# Patient Record
Sex: FEMALE | Race: OTHER | HISPANIC OR LATINO
[De-identification: names, ages, dates, MRNs, and addresses within clinical notes are randomized per-mention and may not be internally consistent; named-entity substitution may affect disease eponyms.]

---

## 2022-12-11 ENCOUNTER — NON-APPOINTMENT (OUTPATIENT)
Age: 67
End: 2022-12-11

## 2022-12-12 ENCOUNTER — EMERGENCY (EMERGENCY)
Facility: HOSPITAL | Age: 67
LOS: 0 days | Discharge: HOME | End: 2022-12-12
Attending: EMERGENCY MEDICINE | Admitting: EMERGENCY MEDICINE

## 2022-12-12 VITALS
OXYGEN SATURATION: 97 % | WEIGHT: 160.06 LBS | SYSTOLIC BLOOD PRESSURE: 124 MMHG | RESPIRATION RATE: 20 BRPM | DIASTOLIC BLOOD PRESSURE: 84 MMHG | HEART RATE: 78 BPM | TEMPERATURE: 100 F

## 2022-12-12 VITALS
HEART RATE: 77 BPM | SYSTOLIC BLOOD PRESSURE: 182 MMHG | RESPIRATION RATE: 20 BRPM | DIASTOLIC BLOOD PRESSURE: 74 MMHG | OXYGEN SATURATION: 100 %

## 2022-12-12 DIAGNOSIS — M25.511 PAIN IN RIGHT SHOULDER: ICD-10-CM

## 2022-12-12 DIAGNOSIS — M54.2 CERVICALGIA: ICD-10-CM
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
## 2022-12-12 DIAGNOSIS — S06.0X9A CONCUSSION WITH LOSS OF CONSCIOUSNESS OF UNSPECIFIED DURATION, INITIAL ENCOUNTER: ICD-10-CM

## 2022-12-12 DIAGNOSIS — Z23 ENCOUNTER FOR IMMUNIZATION: ICD-10-CM
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
## 2022-12-12 DIAGNOSIS — S70.11XA CONTUSION OF RIGHT THIGH, INITIAL ENCOUNTER: ICD-10-CM

## 2022-12-12 LAB
ALBUMIN SERPL ELPH-MCNC: 4.5 G/DL — SIGNIFICANT CHANGE UP (ref 3.5–5.2)
ALP SERPL-CCNC: 119 U/L — HIGH (ref 30–115)
ALT FLD-CCNC: 11 U/L — SIGNIFICANT CHANGE UP (ref 0–41)
ANION GAP SERPL CALC-SCNC: 14 MMOL/L — SIGNIFICANT CHANGE UP (ref 7–14)
APTT BLD: 32.3 SEC — SIGNIFICANT CHANGE UP (ref 27–39.2)
AST SERPL-CCNC: 19 U/L — SIGNIFICANT CHANGE UP (ref 0–41)
BASOPHILS # BLD AUTO: 0.03 K/UL — SIGNIFICANT CHANGE UP (ref 0–0.2)
BASOPHILS NFR BLD AUTO: 0.2 % — SIGNIFICANT CHANGE UP (ref 0–1)
BILIRUB SERPL-MCNC: 0.4 MG/DL — SIGNIFICANT CHANGE UP (ref 0.2–1.2)
BUN SERPL-MCNC: 16 MG/DL — SIGNIFICANT CHANGE UP (ref 10–20)
CALCIUM SERPL-MCNC: 9.8 MG/DL — SIGNIFICANT CHANGE UP (ref 8.4–10.5)
CHLORIDE SERPL-SCNC: 102 MMOL/L — SIGNIFICANT CHANGE UP (ref 98–110)
CO2 SERPL-SCNC: 23 MMOL/L — SIGNIFICANT CHANGE UP (ref 17–32)
CREAT SERPL-MCNC: 0.8 MG/DL — SIGNIFICANT CHANGE UP (ref 0.7–1.5)
EGFR: 81 ML/MIN/1.73M2 — SIGNIFICANT CHANGE UP
EOSINOPHIL # BLD AUTO: 0.41 K/UL — SIGNIFICANT CHANGE UP (ref 0–0.7)
EOSINOPHIL NFR BLD AUTO: 3.1 % — SIGNIFICANT CHANGE UP (ref 0–8)
ETHANOL SERPL-MCNC: <10 MG/DL — SIGNIFICANT CHANGE UP
GLUCOSE SERPL-MCNC: 85 MG/DL — SIGNIFICANT CHANGE UP (ref 70–99)
HCT VFR BLD CALC: 43.8 % — SIGNIFICANT CHANGE UP (ref 37–47)
HGB BLD-MCNC: 14.3 G/DL — SIGNIFICANT CHANGE UP (ref 12–16)
IMM GRANULOCYTES NFR BLD AUTO: 0.4 % — HIGH (ref 0.1–0.3)
INR BLD: 0.94 RATIO — SIGNIFICANT CHANGE UP (ref 0.65–1.3)
LACTATE SERPL-SCNC: 1.4 MMOL/L — SIGNIFICANT CHANGE UP (ref 0.7–2)
LIDOCAIN IGE QN: 35 U/L — SIGNIFICANT CHANGE UP (ref 7–60)
LYMPHOCYTES # BLD AUTO: 1.34 K/UL — SIGNIFICANT CHANGE UP (ref 1.2–3.4)
LYMPHOCYTES # BLD AUTO: 10.3 % — LOW (ref 20.5–51.1)
MCHC RBC-ENTMCNC: 28.6 PG — SIGNIFICANT CHANGE UP (ref 27–31)
MCHC RBC-ENTMCNC: 32.6 G/DL — SIGNIFICANT CHANGE UP (ref 32–37)
MCV RBC AUTO: 87.6 FL — SIGNIFICANT CHANGE UP (ref 81–99)
MONOCYTES # BLD AUTO: 0.48 K/UL — SIGNIFICANT CHANGE UP (ref 0.1–0.6)
MONOCYTES NFR BLD AUTO: 3.7 % — SIGNIFICANT CHANGE UP (ref 1.7–9.3)
NEUTROPHILS # BLD AUTO: 10.76 K/UL — HIGH (ref 1.4–6.5)
NEUTROPHILS NFR BLD AUTO: 82.3 % — HIGH (ref 42.2–75.2)
NRBC # BLD: 0 /100 WBCS — SIGNIFICANT CHANGE UP (ref 0–0)
PLATELET # BLD AUTO: 342 K/UL — SIGNIFICANT CHANGE UP (ref 130–400)
POTASSIUM SERPL-MCNC: 4.8 MMOL/L — SIGNIFICANT CHANGE UP (ref 3.5–5)
POTASSIUM SERPL-SCNC: 4.8 MMOL/L — SIGNIFICANT CHANGE UP (ref 3.5–5)
PROT SERPL-MCNC: 7 G/DL — SIGNIFICANT CHANGE UP (ref 6–8)
PROTHROM AB SERPL-ACNC: 10.7 SEC — SIGNIFICANT CHANGE UP (ref 9.95–12.87)
RBC # BLD: 5 M/UL — SIGNIFICANT CHANGE UP (ref 4.2–5.4)
RBC # FLD: 12.5 % — SIGNIFICANT CHANGE UP (ref 11.5–14.5)
SODIUM SERPL-SCNC: 139 MMOL/L — SIGNIFICANT CHANGE UP (ref 135–146)
WBC # BLD: 13.07 K/UL — HIGH (ref 4.8–10.8)
WBC # FLD AUTO: 13.07 K/UL — HIGH (ref 4.8–10.8)

## 2022-12-12 PROCEDURE — 73552 X-RAY EXAM OF FEMUR 2/>: CPT | Mod: 26,RT

## 2022-12-12 PROCEDURE — 72125 CT NECK SPINE W/O DYE: CPT | Mod: 26,MA

## 2022-12-12 PROCEDURE — 99285 EMERGENCY DEPT VISIT HI MDM: CPT

## 2022-12-12 PROCEDURE — 71045 X-RAY EXAM CHEST 1 VIEW: CPT | Mod: 26

## 2022-12-12 PROCEDURE — 72170 X-RAY EXAM OF PELVIS: CPT | Mod: 26

## 2022-12-12 PROCEDURE — 70450 CT HEAD/BRAIN W/O DYE: CPT | Mod: 26,MA

## 2022-12-12 PROCEDURE — 73030 X-RAY EXAM OF SHOULDER: CPT | Mod: 26,RT

## 2022-12-12 RX ORDER — ACETAMINOPHEN 500 MG
650 TABLET ORAL ONCE
Refills: 0 | Status: COMPLETED | OUTPATIENT
Start: 2022-12-12 | End: 2022-12-12

## 2022-12-12 RX ORDER — MORPHINE SULFATE 50 MG/1
4 CAPSULE, EXTENDED RELEASE ORAL ONCE
Refills: 0 | Status: DISCONTINUED | OUTPATIENT
Start: 2022-12-12 | End: 2022-12-12

## 2022-12-12 RX ORDER — TETANUS TOXOID, REDUCED DIPHTHERIA TOXOID AND ACELLULAR PERTUSSIS VACCINE, ADSORBED 5; 2.5; 8; 8; 2.5 [IU]/.5ML; [IU]/.5ML; UG/.5ML; UG/.5ML; UG/.5ML
0.5 SUSPENSION INTRAMUSCULAR ONCE
Refills: 0 | Status: COMPLETED | OUTPATIENT
Start: 2022-12-12 | End: 2022-12-12

## 2022-12-12 RX ADMIN — TETANUS TOXOID, REDUCED DIPHTHERIA TOXOID AND ACELLULAR PERTUSSIS VACCINE, ADSORBED 0.5 MILLILITER(S): 5; 2.5; 8; 8; 2.5 SUSPENSION INTRAMUSCULAR at 11:03

## 2022-12-12 RX ADMIN — Medication 650 MILLIGRAM(S): at 12:02

## 2022-12-12 RX ADMIN — MORPHINE SULFATE 4 MILLIGRAM(S): 50 CAPSULE, EXTENDED RELEASE ORAL at 11:05

## 2022-12-12 NOTE — ED PROVIDER NOTE - CARE PROVIDER_API CALL
Your PCP,   Phone: (   )    -  Fax: (   )    -  Follow Up Time: 1-3 Days   Your PCP,   Phone: (   )    -  Fax: (   )    -  Follow Up Time: 1-3 Days    Kenrick Stevens)  EEGEpilepsy; Neurology  [address] [place] [address], [address]  [place] [address]  Phone: [phone]  Fax: [phone]  Follow Up Time: 1-3 Days

## 2022-12-12 NOTE — ED PROVIDER NOTE - OBJECTIVE STATEMENT
Pt is a 66 y/o Urdu speaking female with ? presenting s/p fall at home. Pt reports walking down the stairs when she slipped on her slipper and fell down ~12 stairs. + Head trauma, + LOC, no blood thinners. Pt was unable to get up on her own. Pt's daughter lives next door and checked on pt and helped her get up. Pt was ambulatory after. Reports pain to right side of her head and right shoulder. Went to [facility] who placed staples on scalp. Pt is a 68 y/o Greenlandic speaking female with PMH of glaucoma presenting s/p fall at home. Pt reports walking down the stairs when she slipped on her slipper and fell down ~12 stairs. + Head trauma, + LOC, no blood thinners. Pt was unable to get up on her own. Pt's daughter lives next door and checked on pt and helped her get up. Pt was ambulatory after. Reports pain to right side of her head and right shoulder. Went to [facility] [place] staples on scalp.

## 2022-12-12 NOTE — ED PROVIDER NOTE - PHYSICAL EXAMINATION
CONST: mild distress  HEAD: staples to right temporoparietal scalp with dried blood  EYES: PERRLA, conjunctivae without injection, drainage or discharge, no raccoon eyes  ENMT: nasal mucosa moist; mouth moist without ulcerations or lesions; throat moist without erythema, exudate, ulcerations or lesions  NECK:  supple, + cervical midline tenderness  CARDIAC:  regular rate and rhythm, normal S1 and S2, no murmurs, rubs or gallops  RESP:  respiratory rate and effort appear normal for age; lungs are clear to auscultation bilaterally; no rales or wheezes  ABDOMEN:  soft, nontender, nondistended  EXTREMITIES: + right shoulder tenderness with decreased ROM, + ecchymoses to right thigh  NEURO:  awake and alert, SCHULER, sensation intact throughout  SKIN:  normal skin color for age and race, well-perfused; warm and dry CONST: mild distress  HEAD: 5 staples to right temporoparietal scalp with dried blood  EYES: PERRLA, conjunctivae without injection, drainage or discharge, no raccoon eyes  ENMT: nasal mucosa moist; mouth moist without ulcerations or lesions; throat moist without erythema, exudate, ulcerations or lesions  NECK:  supple, + cervical midline tenderness  CARDIAC:  regular rate and rhythm, normal S1 and S2, no murmurs, rubs or gallops  RESP:  respiratory rate and effort appear normal for age; lungs are clear to auscultation bilaterally; no rales or wheezes  ABDOMEN:  soft, nontender, nondistended  EXTREMITIES: + right shoulder tenderness with decreased ROM, + ecchymoses to right thigh  NEURO:  awake and alert, SCHULER, sensation intact throughout  SKIN:  normal skin color for age and race, well-perfused; warm and dry

## 2022-12-12 NOTE — ED PROVIDER NOTE - PROVIDER TOKENS
FREE:[LAST:[Your PCP],PHONE:[(   )    -],FAX:[(   )    -],FOLLOWUP:[1-3 Days]] FREE:[LAST:[Your PCP],PHONE:[(   )    -],FAX:[(   )    -],FOLLOWUP:[1-3 Days]],PROVIDER:[TOKEN:[[id number]],FOLLOWUP:[1-3 Days]]

## 2022-12-12 NOTE — ED PROVIDER NOTE - PROGRESS NOTE DETAILS
PS: CT head and C spine negative for trauma. Incidental finding of age indeterminate infarct. Neuro intact. Pt and daughter aware. Recommended outpatient follow up for MRI. PS: Pt ambulated well without assistance. Pt to follow up with neuro outpatient and knows to return for staple removal

## 2022-12-12 NOTE — ED PROVIDER NOTE - PATIENT PORTAL LINK FT
You can access the FollowMyHealth Patient Portal offered by [facility] by registering at the following website: [email or web address]. By joining [employer]’s FollowMyHealth portal, you will also be able to view your health information using other applications (apps) compatible with our system.

## 2022-12-12 NOTE — ED PROVIDER NOTE - ATTENDING CONTRIBUTION TO CARE
67-year-old female presenting status post mechanical trip and fall down 12 steps.  Positive head injury, positive LOC.  No anticoagulation.  Went to urgent care, had staples placed in scalp.  Sent in for further evaluation.  Currently also complaining of neck pain and right shoulder pain.  Has been ambulatory since fall.  Well appearing, NAD, non toxic.  Stapled scalp laceration PERRLA EOMI neck supple midline C-spine tenderness normal wob cta bl rrr abdomen s nt nd no rebound no guarding WWPx4 neuro non focal 2+ equal pulses, less than 2-second capillary refill, ecchymosis to right lower extremity, tenderness palpation right shoulder.

## 2022-12-12 NOTE — ED PROVIDER NOTE - CARE PROVIDERS DIRECT ADDRESSES
,DirectAddress_Unknown ,DirectAddress_Unknown,[email or web address]

## 2022-12-12 NOTE — ED PROVIDER NOTE - CLINICAL SUMMARY MEDICAL DECISION MAKING FREE TEXT BOX
67-year-old female presenting status post mechanical trip and fall down 12 steps.  Positive head injury, positive LOC.  No anticoagulation.  Went to urgent care, had staples placed in scalp.  Sent in for further evaluation.  Currently also complaining of neck pain and right shoulder pain.  Has been ambulatory since fall.  Well appearing, NAD, non toxic.  Stapled scalp laceration PERRLA EOMI neck supple midline C-spine tenderness normal wob cta bl rrr abdomen s nt nd no rebound no guarding WWPx4 neuro non focal 2+ equal pulses, less than 2-second capillary refill, ecchymosis to right lower extremity, tenderness palpation right shoulder. labs imaging reviewed. pt ambulatory wo assistance. Aware of all results, given a copy of all available results, comfortable with discharge and follow-up outpatient, strict return precautions given. Endorses understanding of all of this and aware that they can return at any time for new or concerning symptoms. No further questions or concerns at this time

## 2022-12-12 NOTE — ED ADULT NURSE NOTE - NSIMPLEMENTINTERV_GEN_ALL_ED
Implemented All Fall with Harm Risk Interventions:  [place] to call system. Call bell, personal items and telephone within reach. Instruct patient to call for assistance. Room bathroom lighting operational. Non-slip footwear when patient is off stretcher. Physically safe environment: no spills, clutter or unnecessary equipment. Stretcher in lowest position, wheels locked, appropriate side rails in place. Provide visual cue, wrist band, yellow gown, etc. Monitor gait and stability. Monitor for mental status changes and reorient to person, place, and time. Review medications for side effects contributing to fall risk. Reinforce activity limits and safety measures with patient and family. Provide visual clues: red socks.

## 2022-12-12 NOTE — ED ADULT NURSE NOTE - OBJECTIVE STATEMENT
BIBEMS from [facility] for fall down flight of stairs (+) head trauma (+) LOC, denies AC use. C-collar in place c/o neck pain.

## 2022-12-12 NOTE — ED PROVIDER NOTE - NSFOLLOWUPINSTRUCTIONS_ED_ALL_ED_FT
Please return in 7-10 days for staple removal    Fall Prevention in the Home, Adult      Falls can cause injuries and can happen to people of all ages. There are many things you can do to make your home safe and to help prevent falls. Ask for help when making these changes.      What actions can I take to prevent falls?    General Instructions     •Use good lighting in all rooms. Replace any light bulbs that burn out.      •Turn on the lights in dark areas. Use night-lights.      •Keep items that you use often in easy-to-reach places. Lower the shelves around your home if needed.      •Set up your furniture so you have a clear path. Avoid moving your furniture around.      • Do not have throw rugs or other things on the floor that can make you trip.      •Avoid walking on wet floors.      •If any of your floors are uneven, fix them.    •Add color or contrast paint or tape to clearly noah and help you see:  •Grab bars or handrails.      •First and last steps of staircases.      •Where the edge of each step is.      •If you use a stepladder:  •Make sure that it is fully opened. Do not climb a closed stepladder.      •Make sure the sides of the stepladder are locked in place.      •Ask someone to hold the stepladder while you use it.        •Know where your pets are when moving through your home.        What can I do in the bathroom?                   •Keep the floor dry. Clean up any water on the floor right away.      •Remove soap buildup in the tub or shower.      •Use nonskid mats or decals on the floor of the tub or shower.      •Attach bath mats securely with double-sided, nonslip rug tape.      •If you need to sit down in the shower, use a plastic, nonslip stool.      •Install grab bars by the toilet and in the tub and shower. Do not use towel bars as grab bars.      What can I do in the bedroom?     •Make sure that you have a light by your bed that is easy to reach.      • Do not use any sheets or blankets for your bed that hang to the floor.      •Have a firm chair with side arms that you can use for support when you get dressed.      What can I do in the kitchen?     •Clean up any spills right away.      •If you need to reach something above you, use a step stool with a grab bar.      •Keep electrical cords out of the way.      • Do not use floor polish or wax that makes floors slippery.      What can I do with my stairs?     • Do not leave any items on the stairs.      •Make sure that you have a light switch at the top and the bottom of the stairs.      •Make sure that there are handrails on both sides of the stairs. Fix handrails that are broken or loose.      •Install nonslip stair treads on all your stairs.      •Avoid having throw rugs at the top or bottom of the stairs.      •Choose a carpet that does not hide the edge of the steps on the stairs.      •Check carpeting to make sure that it is firmly attached to the stairs. Fix carpet that is loose or worn.      What can I do on the outside of my home?     •Use bright outdoor lighting.      •Fix the edges of walkways and driveways and fix any cracks.      •Remove anything that might make you trip as you walk through a door, such as a raised step or threshold.      •Trim any bushes or trees on paths to your home.      •Check to see if handrails are loose or broken and that both sides of all steps have handrails.      •Install guardrails along the edges of any raised decks and porches.      •Clear paths of anything that can make you trip, such as tools or rocks.      •Have leaves, snow, or ice cleared regularly.      •Use sand or salt on paths during winter.      •Clean up any spills in your garage right away. This includes grease or oil spills.      What other actions can I take?   •Wear shoes that:  •Have a low heel. Do not wear high heels.      •Have rubber bottoms.      •Feel good on your feet and fit well.      •Are closed at the toe. Do not wear open-toe sandals.      •Use tools that help you move around if needed. These include:  •Canes.      •Walkers.      •Scooters.      •Crutches.        •Review your medicines with your doctor. Some medicines can make you feel dizzy. This can increase your chance of falling.      Ask your doctor what else you can do to help prevent falls.      Where to find more information    •Centers for Disease Control and Prevention, STEADI: www.cdc.gov      •National [place] on Aging: www.jess.nih.gov        Contact a doctor if:    •You are afraid of falling at home.      •You feel weak, drowsy, or dizzy at home.      •You fall at home.        Summary    •There are many simple things that you can do to make your home safe and to help prevent falls.      •Ways to make your home safe include removing things that can make you trip and installing grab bars in the bathroom.      •Ask for help when making these changes in your home.      This information is not intended to replace advice given to you by your health care provider. Make sure you discuss any questions you have with your health care provider.

## 2022-12-19 ENCOUNTER — NON-APPOINTMENT (OUTPATIENT)
Age: 67
End: 2022-12-19

## 2023-01-31 ENCOUNTER — EMERGENCY (EMERGENCY)
Facility: HOSPITAL | Age: 68
LOS: 0 days | Discharge: HOME | End: 2023-02-01
Attending: STUDENT IN AN ORGANIZED HEALTH CARE EDUCATION/TRAINING PROGRAM | Admitting: STUDENT IN AN ORGANIZED HEALTH CARE EDUCATION/TRAINING PROGRAM
Payer: MEDICARE

## 2023-01-31 VITALS
SYSTOLIC BLOOD PRESSURE: 151 MMHG | RESPIRATION RATE: 18 BRPM | DIASTOLIC BLOOD PRESSURE: 67 MMHG | TEMPERATURE: 98 F | HEART RATE: 86 BPM | OXYGEN SATURATION: 99 % | WEIGHT: 152.12 LBS

## 2023-01-31 DIAGNOSIS — K52.9 NONINFECTIVE GASTROENTERITIS AND COLITIS, UNSPECIFIED: ICD-10-CM

## 2023-01-31 DIAGNOSIS — R50.9 FEVER, UNSPECIFIED: ICD-10-CM

## 2023-01-31 DIAGNOSIS — K92.1 MELENA: ICD-10-CM

## 2023-01-31 DIAGNOSIS — Z87.19 PERSONAL HISTORY OF OTHER DISEASES OF THE DIGESTIVE SYSTEM: ICD-10-CM

## 2023-01-31 DIAGNOSIS — R10.30 LOWER ABDOMINAL PAIN, UNSPECIFIED: ICD-10-CM

## 2023-01-31 LAB
ALBUMIN SERPL ELPH-MCNC: 4.1 G/DL — SIGNIFICANT CHANGE UP (ref 3.5–5.2)
ALP SERPL-CCNC: 109 U/L — SIGNIFICANT CHANGE UP (ref 30–115)
ALT FLD-CCNC: 9 U/L — SIGNIFICANT CHANGE UP (ref 0–41)
ANION GAP SERPL CALC-SCNC: 9 MMOL/L — SIGNIFICANT CHANGE UP (ref 7–14)
AST SERPL-CCNC: 15 U/L — SIGNIFICANT CHANGE UP (ref 0–41)
BASOPHILS # BLD AUTO: 0.05 K/UL — SIGNIFICANT CHANGE UP (ref 0–0.2)
BASOPHILS NFR BLD AUTO: 0.5 % — SIGNIFICANT CHANGE UP (ref 0–1)
BILIRUB SERPL-MCNC: 0.3 MG/DL — SIGNIFICANT CHANGE UP (ref 0.2–1.2)
BUN SERPL-MCNC: 18 MG/DL — SIGNIFICANT CHANGE UP (ref 10–20)
CALCIUM SERPL-MCNC: 9.8 MG/DL — SIGNIFICANT CHANGE UP (ref 8.4–10.5)
CHLORIDE SERPL-SCNC: 102 MMOL/L — SIGNIFICANT CHANGE UP (ref 98–110)
CO2 SERPL-SCNC: 26 MMOL/L — SIGNIFICANT CHANGE UP (ref 17–32)
CREAT SERPL-MCNC: 0.9 MG/DL — SIGNIFICANT CHANGE UP (ref 0.7–1.5)
EGFR: 70 ML/MIN/1.73M2 — SIGNIFICANT CHANGE UP
EOSINOPHIL # BLD AUTO: 0.58 K/UL — SIGNIFICANT CHANGE UP (ref 0–0.7)
EOSINOPHIL NFR BLD AUTO: 5.6 % — SIGNIFICANT CHANGE UP (ref 0–8)
GLUCOSE SERPL-MCNC: 80 MG/DL — SIGNIFICANT CHANGE UP (ref 70–99)
HCT VFR BLD CALC: 38.4 % — SIGNIFICANT CHANGE UP (ref 37–47)
HGB BLD-MCNC: 13.2 G/DL — SIGNIFICANT CHANGE UP (ref 12–16)
IMM GRANULOCYTES NFR BLD AUTO: 0.3 % — SIGNIFICANT CHANGE UP (ref 0.1–0.3)
LIDOCAIN IGE QN: 29 U/L — SIGNIFICANT CHANGE UP (ref 7–60)
LYMPHOCYTES # BLD AUTO: 2.26 K/UL — SIGNIFICANT CHANGE UP (ref 1.2–3.4)
LYMPHOCYTES # BLD AUTO: 21.9 % — SIGNIFICANT CHANGE UP (ref 20.5–51.1)
MCHC RBC-ENTMCNC: 29.7 PG — SIGNIFICANT CHANGE UP (ref 27–31)
MCHC RBC-ENTMCNC: 34.4 G/DL — SIGNIFICANT CHANGE UP (ref 32–37)
MCV RBC AUTO: 86.3 FL — SIGNIFICANT CHANGE UP (ref 81–99)
MONOCYTES # BLD AUTO: 0.45 K/UL — SIGNIFICANT CHANGE UP (ref 0.1–0.6)
MONOCYTES NFR BLD AUTO: 4.4 % — SIGNIFICANT CHANGE UP (ref 1.7–9.3)
NEUTROPHILS # BLD AUTO: 6.93 K/UL — HIGH (ref 1.4–6.5)
NEUTROPHILS NFR BLD AUTO: 67.3 % — SIGNIFICANT CHANGE UP (ref 42.2–75.2)
NRBC # BLD: 0 /100 WBCS — SIGNIFICANT CHANGE UP (ref 0–0)
PLATELET # BLD AUTO: 357 K/UL — SIGNIFICANT CHANGE UP (ref 130–400)
POTASSIUM SERPL-MCNC: 4.5 MMOL/L — SIGNIFICANT CHANGE UP (ref 3.5–5)
POTASSIUM SERPL-SCNC: 4.5 MMOL/L — SIGNIFICANT CHANGE UP (ref 3.5–5)
PROT SERPL-MCNC: 6.8 G/DL — SIGNIFICANT CHANGE UP (ref 6–8)
RBC # BLD: 4.45 M/UL — SIGNIFICANT CHANGE UP (ref 4.2–5.4)
RBC # FLD: 12.4 % — SIGNIFICANT CHANGE UP (ref 11.5–14.5)
SODIUM SERPL-SCNC: 137 MMOL/L — SIGNIFICANT CHANGE UP (ref 135–146)
WBC # BLD: 10.3 K/UL — SIGNIFICANT CHANGE UP (ref 4.8–10.8)
WBC # FLD AUTO: 10.3 K/UL — SIGNIFICANT CHANGE UP (ref 4.8–10.8)

## 2023-01-31 PROCEDURE — 74177 CT ABD & PELVIS W/CONTRAST: CPT | Mod: 26,MA

## 2023-01-31 PROCEDURE — 99284 EMERGENCY DEPT VISIT MOD MDM: CPT

## 2023-01-31 RX ORDER — METRONIDAZOLE 500 MG
500 TABLET ORAL ONCE
Refills: 0 | Status: COMPLETED | OUTPATIENT
Start: 2023-01-31 | End: 2023-01-31

## 2023-01-31 RX ORDER — CIPROFLOXACIN LACTATE 400MG/40ML
500 VIAL (ML) INTRAVENOUS EVERY 12 HOURS
Refills: 0 | Status: DISCONTINUED | OUTPATIENT
Start: 2023-01-31 | End: 2023-02-01

## 2023-01-31 RX ORDER — SODIUM CHLORIDE 9 MG/ML
1000 INJECTION INTRAMUSCULAR; INTRAVENOUS; SUBCUTANEOUS ONCE
Refills: 0 | Status: COMPLETED | OUTPATIENT
Start: 2023-01-31 | End: 2023-01-31

## 2023-01-31 RX ORDER — ONDANSETRON 8 MG/1
4 TABLET, FILM COATED ORAL ONCE
Refills: 0 | Status: COMPLETED | OUTPATIENT
Start: 2023-01-31 | End: 2023-01-31

## 2023-01-31 RX ORDER — KETOROLAC TROMETHAMINE 30 MG/ML
15 SYRINGE (ML) INJECTION ONCE
Refills: 0 | Status: DISCONTINUED | OUTPATIENT
Start: 2023-01-31 | End: 2023-01-31

## 2023-01-31 RX ADMIN — SODIUM CHLORIDE 1000 MILLILITER(S): 9 INJECTION INTRAMUSCULAR; INTRAVENOUS; SUBCUTANEOUS at 23:03

## 2023-01-31 RX ADMIN — Medication 15 MILLIGRAM(S): at 21:26

## 2023-01-31 RX ADMIN — ONDANSETRON 4 MILLIGRAM(S): 8 TABLET, FILM COATED ORAL at 21:26

## 2023-01-31 NOTE — ED ADULT TRIAGE NOTE - TEMPERATURE IN CELSIUS (DEGREES C)
Patient received in bed, refusing physical therapy eval and treatment.  "I will hurt too much".  RN in to try to motivate, but not able. Patient received in bed, +HM, +O2@3L via nc, +PrimaFit.  Patient with breakfast tray in front x 1 hour, not able to feed herself well (CNA informed). Patient c/o L shoulder and neck pain at rest, and pain "all over".  Not able to quantify. Not answering questions. 36.5

## 2023-02-01 LAB
APPEARANCE UR: CLEAR — SIGNIFICANT CHANGE UP
BILIRUB UR-MCNC: NEGATIVE — SIGNIFICANT CHANGE UP
COLOR SPEC: SIGNIFICANT CHANGE UP
DIFF PNL FLD: NEGATIVE — SIGNIFICANT CHANGE UP
GLUCOSE UR QL: NEGATIVE — SIGNIFICANT CHANGE UP
KETONES UR-MCNC: SIGNIFICANT CHANGE UP
LEUKOCYTE ESTERASE UR-ACNC: NEGATIVE — SIGNIFICANT CHANGE UP
NITRITE UR-MCNC: NEGATIVE — SIGNIFICANT CHANGE UP
PH UR: 6 — SIGNIFICANT CHANGE UP (ref 5–8)
PROT UR-MCNC: SIGNIFICANT CHANGE UP
SP GR SPEC: >1.05 (ref 1.01–1.03)
UROBILINOGEN FLD QL: SIGNIFICANT CHANGE UP

## 2023-02-01 RX ORDER — DIPHENHYDRAMINE HCL 50 MG
25 CAPSULE ORAL ONCE
Refills: 0 | Status: DISCONTINUED | OUTPATIENT
Start: 2023-02-01 | End: 2023-02-01

## 2023-02-01 RX ORDER — SACCHAROMYCES BOULARDII 250 MG
1 POWDER IN PACKET (EA) ORAL
Qty: 30 | Refills: 0
Start: 2023-02-01

## 2023-02-01 NOTE — ED PROVIDER NOTE - PROGRESS NOTE DETAILS
patient has allergic reaction to IV cipro (hives). will give benadryl and change to augmentin as abx.

## 2023-02-01 NOTE — ED PROVIDER NOTE - PHYSICAL EXAMINATION
CONSTITUTIONAL: Well-appearing; well-nourished; in no apparent distress.   EYES: PERRL; EOM intact.   CARDIOVASCULAR: Normal S1, S2; no murmurs, rubs, or gallops.   RESPIRATORY: Normal chest excursion with respiration; breath sounds clear and equal bilaterally; no wheezes, rhonchi, or rales.  GI/: + ttp to LLQ / suprapubic region. soft and no guarding. Normal bowel sounds; non-distended; no palpable organomegaly.   MS: No calf swelling and tenderness.  SKIN: Normal for age and race; warm; dry; good turgor; no apparent lesions or exudate.   NEURO/PSYCH: A & O x 4; grossly unremarkable.

## 2023-02-01 NOTE — ED PROVIDER NOTE - NS ED ATTENDING STATEMENT MOD
This was a shared visit with the ARIADNE. I reviewed and verified the documentation and independently performed the documented:

## 2023-02-01 NOTE — ED PROVIDER NOTE - OBJECTIVE STATEMENT
67 years old female hx of colitis present c/o lower abdominal pain and bloody stool in the past 2 days. pain is constant today and associates with fever/chill so she comes to ED for evaluation. reports similar episode in the past and was evaluated GI. last colonoscopy 5-6 years ago. otherwise denies nausea/vomiting/ change of appetite/constipation and urinary sxs. Denies HA/dizziness/chest pain/sob/palpitation.

## 2023-02-01 NOTE — ED PROVIDER NOTE - CLINICAL SUMMARY MEDICAL DECISION MAKING FREE TEXT BOX
I personally evaluated the patient. I reviewed the Resident’s or Physician Assistant’s note (as assigned above), and agree with the findings and plan except as documented in my note. Patient evaluated for abd pain. Labs, ct performed in the ED. Given medication for pain in the ED and fluid resuscitation. Colitis noted on CT, abx prescribed and given GI follow up. Abd soft with no peritoneal signs on reassessment. I have fully discussed the medical management and delivery of care with the patient. I have discussed any available labs, imaging and treatment options with the patient. Patient confirms understanding and has been given detailed return precautions. Patient instructed to return to the ED should symptoms persist or worsen. Patient has demonstrated capacity and has verbalized understanding. Patient is well appearing upon discharge.

## 2023-02-01 NOTE — ED PROVIDER NOTE - NSFOLLOWUPCLINICS_GEN_ALL_ED_FT
[facility]  Gastroenterology  [address] [place] [address]  Phone: [phone]  Fax: [phone]

## 2023-02-01 NOTE — ED PROVIDER NOTE - PATIENT PORTAL LINK FT
You can access the FollowMyHealth Patient Portal offered by [facility] by registering at the following website: [email or web address]. By joining [employer]’s FollowMyHealth portal, you will also be able to view your health information using other applications (apps) compatible with our system.

## 2023-02-27 PROBLEM — Z00.00 ENCOUNTER FOR PREVENTIVE HEALTH EXAMINATION: Status: ACTIVE | Noted: 2023-02-27

## 2023-03-03 ENCOUNTER — APPOINTMENT (OUTPATIENT)
Dept: GASTROENTEROLOGY | Facility: CLINIC | Age: 68
End: 2023-03-03
Payer: MEDICARE

## 2023-03-03 VITALS — BODY MASS INDEX: 27.31 KG/M2 | HEIGHT: 64 IN | WEIGHT: 160 LBS

## 2023-03-03 DIAGNOSIS — Z86.69 PERSONAL HISTORY OF OTHER DISEASES OF THE NERVOUS SYSTEM AND SENSE ORGANS: ICD-10-CM

## 2023-03-03 DIAGNOSIS — Z63.5 DISRUPTION OF FAMILY BY SEPARATION AND DIVORCE: ICD-10-CM

## 2023-03-03 DIAGNOSIS — Z78.9 OTHER SPECIFIED HEALTH STATUS: ICD-10-CM

## 2023-03-03 DIAGNOSIS — K92.1 MELENA: ICD-10-CM

## 2023-03-03 DIAGNOSIS — Z12.11 ENCOUNTER FOR SCREENING FOR MALIGNANT NEOPLASM OF COLON: ICD-10-CM

## 2023-03-03 DIAGNOSIS — Z80.0 FAMILY HISTORY OF MALIGNANT NEOPLASM OF DIGESTIVE ORGANS: ICD-10-CM

## 2023-03-03 DIAGNOSIS — Z86.79 PERSONAL HISTORY OF OTHER DISEASES OF THE CIRCULATORY SYSTEM: ICD-10-CM

## 2023-03-03 DIAGNOSIS — K52.9 NONINFECTIVE GASTROENTERITIS AND COLITIS, UNSPECIFIED: ICD-10-CM

## 2023-03-03 DIAGNOSIS — Z86.010 PERSONAL HISTORY OF COLONIC POLYPS: ICD-10-CM

## 2023-03-03 PROCEDURE — 99204 OFFICE O/P NEW MOD 45 MIN: CPT | Mod: 1L

## 2023-03-03 PROCEDURE — XXXXX: CPT | Mod: 1L

## 2023-03-03 RX ORDER — DICLOFENAC SODIUM 20 MG/G
2 SOLUTION TOPICAL
Refills: 0 | Status: ACTIVE | COMMUNITY

## 2023-03-03 RX ORDER — SEMAGLUTIDE 2.68 MG/ML
8 INJECTION, SOLUTION SUBCUTANEOUS
Refills: 0 | Status: ACTIVE | COMMUNITY
Start: 2023-03-03

## 2023-03-03 RX ORDER — OLOPATADINE HYDROCHLORIDE 2 MG/ML
0.2 SOLUTION OPHTHALMIC
Refills: 0 | Status: ACTIVE | COMMUNITY

## 2023-03-03 RX ORDER — OXYBUTYNIN CHLORIDE 5 MG/1
5 TABLET ORAL
Refills: 0 | Status: ACTIVE | COMMUNITY

## 2023-03-03 RX ORDER — ICOSAPENT ETHYL 1000 MG/1
1 CAPSULE ORAL
Refills: 0 | Status: ACTIVE | COMMUNITY

## 2023-03-03 RX ORDER — POLYETHYLENE GLYCOL 3350 AND ELECTROLYTES WITH LEMON FLAVOR 236; 22.74; 6.74; 5.86; 2.97 G/4L; G/4L; G/4L; G/4L; G/4L
236 POWDER, FOR SOLUTION ORAL
Qty: 1 | Refills: 0 | Status: ACTIVE | COMMUNITY
Start: 2023-03-03 | End: 1900-01-01

## 2023-03-03 RX ORDER — NETARSUDIL AND LATANOPROST OPHTHALMIC SOLUTION, 0.02%/0.005% .2; .05 MG/ML; MG/ML
0.02-0.005 SOLUTION/ DROPS OPHTHALMIC; TOPICAL
Refills: 0 | Status: ACTIVE | COMMUNITY

## 2023-03-03 SDOH — SOCIAL STABILITY - SOCIAL INSECURITY: DISRUPTION OF FAMILY BY SEPARATION AND DIVORCE: Z63.5

## 2023-03-03 NOTE — ASSESSMENT
[FreeTextEntry1] : 67 yr old female with H/O Abdominal pain, diarrhea, rectal bleeding who presented to the ED on 23 and CT Scan of Abdomen showed mural thickening of the transverse colon without fat stranding or inflammatory changes. She was prescribed antibiotics which she completed. She still C/O LLQ and RLQ pain (milder than previously) but no further diarrhea or rectal bleeding. She also C/O a long H/O heartburn; she had an EGD 7-8 yrs ago which showed an ulcer; she denied a H/O H Pylori.  She stated that she has the heartburn q day mostly when she eats spicy foods or cheese; she takes Esomeprazole OD q am x about 10 years without improvement. She denied dysphagia, fevers, chills, jaundice. She also reports melena, intermittently  x 1 year. She states that she has a H/O constipation with inability to completely empty her stools.  Her last colonoscopy was about 7 years ago. She stated that ulcers and polyps were found (but unsure of pathology results ([facility])). Her sister  of liver cancer but she denied a H/O gastric, esophageal, colon, or pancreatic cancer or IBD/Celiac disease.\par \par \par Abdominal pain, Suspected Colitis per CT Scan 23\par \par - CT Scan of Abdomen, CBC, CMP, lipase level reviewed\par - Will schedule a colonoscopy; all risks and benefits of procedure were explained to patient today\par \par H/O GERD/stomach ulcer\par \par - Will schedule an EGD at the same time as the colonoscopy; all risks and benefits of the procedure were explained to patient today\par - Continue PPI 1/2 hr before breakfast q day\par - GERD diet and GERD measures recommended\par - F/U after the procedure is done\par \par

## 2023-03-03 NOTE — REVIEW OF SYSTEMS
[Recent Weight Loss (___ Lbs)] : recent [unfilled] ~Ulb weight loss [Abdominal Pain] : abdominal pain [Heartburn] : heartburn [Melena (black stool)] : melena [Negative] : Heme/Lymph [Fever] : no fever [Chills] : no chills [Feeling Poorly] : not feeling poorly [Feeling Tired] : not feeling tired [Vomiting] : no vomiting [Constipation] : no constipation [Diarrhea] : no diarrhea [Bleeding] : no bleeding [Fecal Incontinence (soiling)] : no fecal incontinence [Bloating (gassiness)] : bloating

## 2023-03-03 NOTE — HISTORY OF PRESENT ILLNESS
[FreeTextEntry1] : 67 yr old female with H/O Abdominal pain, diarrhea, rectal bleeding who presented to the ED on 23 and CT Scan of Abdomen showed mural thickening of the transverse colon without fat stranding or inflammatory changes. She was prescribed antibiotics which she completed. She still C/O LLQ and RLQ pain (milder than previously) but no further diarrhea or rectal bleeding. She also C/O a long H/O heartburn; she had an EGD 7-8 yrs ago which showed an ulcer; she denied a H/O H Pylori.  She stated that she has the heartburn q day mostly when she eats spicy foods or cheese; she takes Esomeprazole OD q am x about 10 years without improvement. She denied dysphagia, fevers, chills, jaundice. She also reports melena, intermittently  x 1 year. She states that she has a H/O constipation with inability to completely empty her stools.  Her last colonoscopy was about 7 years ago. She stated that ulcers and polyps were found (but unsure of pathology results ([facility])). Her sister  of liver cancer but she denied a H/O gastric, esophageal, colon, or pancreatic cancer or IBD/Celiac disease. [de-identified] : 1/31/23

## 2023-04-16 ENCOUNTER — LABORATORY RESULT (OUTPATIENT)
Age: 68
End: 2023-04-16

## 2023-04-19 ENCOUNTER — TRANSCRIPTION ENCOUNTER (OUTPATIENT)
Age: 68
End: 2023-04-19

## 2023-04-19 ENCOUNTER — OUTPATIENT (OUTPATIENT)
Dept: INPATIENT UNIT | Facility: HOSPITAL | Age: 68
LOS: 1 days | Discharge: HOME | End: 2023-04-19
Payer: MEDICARE

## 2023-04-19 ENCOUNTER — RESULT REVIEW (OUTPATIENT)
Age: 68
End: 2023-04-19

## 2023-04-19 VITALS
DIASTOLIC BLOOD PRESSURE: 63 MMHG | SYSTOLIC BLOOD PRESSURE: 151 MMHG | OXYGEN SATURATION: 100 % | HEART RATE: 67 BPM | RESPIRATION RATE: 20 BRPM

## 2023-04-19 VITALS
RESPIRATION RATE: 18 BRPM | WEIGHT: 154.1 LBS | HEART RATE: 81 BPM | HEIGHT: 62 IN | SYSTOLIC BLOOD PRESSURE: 132 MMHG | DIASTOLIC BLOOD PRESSURE: 76 MMHG | TEMPERATURE: 98 F

## 2023-04-19 DIAGNOSIS — D64.9 ANEMIA, UNSPECIFIED: ICD-10-CM

## 2023-04-19 DIAGNOSIS — Z98.890 OTHER SPECIFIED POSTPROCEDURAL STATES: Chronic | ICD-10-CM

## 2023-04-19 DIAGNOSIS — Z90.722 ACQUIRED ABSENCE OF OVARIES, BILATERAL: Chronic | ICD-10-CM

## 2023-04-19 DIAGNOSIS — R10.9 UNSPECIFIED ABDOMINAL PAIN: ICD-10-CM

## 2023-04-19 PROCEDURE — 88312 SPECIAL STAINS GROUP 1: CPT

## 2023-04-19 PROCEDURE — 88312 SPECIAL STAINS GROUP 1: CPT | Mod: 26

## 2023-04-19 PROCEDURE — 88305 TISSUE EXAM BY PATHOLOGIST: CPT

## 2023-04-19 PROCEDURE — 88305 TISSUE EXAM BY PATHOLOGIST: CPT | Mod: 26

## 2023-04-19 PROCEDURE — 43239 EGD BIOPSY SINGLE/MULTIPLE: CPT | Mod: XS

## 2023-04-19 PROCEDURE — 45378 DIAGNOSTIC COLONOSCOPY: CPT

## 2023-04-19 NOTE — ASU PATIENT PROFILE, ADULT - NSICDXPASTSURGICALHX_GEN_ALL_CORE_FT
PAST SURGICAL HISTORY:  H/O carpal tunnel repair     H/O eye surgery     History of removal of both ovaries

## 2023-04-19 NOTE — H&P PST ADULT - ASSESSMENT
67 year old female is here for EGD and colonoscopy for evaluation of abdominal pain and recent colitis.

## 2023-04-19 NOTE — ASU PATIENT PROFILE, ADULT - FALL HARM RISK - UNIVERSAL INTERVENTIONS
Bed in lowest position, wheels locked, appropriate side rails in place/Call bell, personal items and telephone in reach/Instruct patient to call for assistance before getting out of bed or chair/Non-slip footwear when patient is out of bed/[place] to call system/Physically safe environment - no spills, clutter or unnecessary equipment/Purposeful Proactive Rounding/Room/bathroom lighting operational, light cord in reach

## 2023-04-19 NOTE — ASU PREOP CHECKLIST - ORDERS/MEDICATION ADMINISTRATION RECORD ON CHART
MRN:[id number]                      After Visit Summary   5/2/2018    Nury Gilbert    MRN: [id number]           Thank you!     Thank you for choosing [facility] for your care. Our goal is always to provide you with excellent care. Hearing back from our patients is one way we can continue to improve our services. Please take a few minutes to complete the written survey that you may receive in the mail after you visit with us. Thank you!        Patient Information     Date Of Birth          1996        Designated Caregiver       Most Recent Value    Caregiver    Will someone help with your care after discharge? yes    Name of designated caregiver Chandrakant    Phone number of caregiver [phone]    Caregiver address , [place]      About your hospital stay     You were admitted on:  May 2, 2018 You last received care in the:  [facility]    You were discharged on:  May 4, 2018        Reason for your hospital stay       Maternity care                  Who to Call     For medical emergencies, please call 911.  For non-urgent questions about your medical care, please call your primary care provider or clinic, [phone]  For questions related to your surgery, please call your surgery clinic        Attending Provider     Provider Specialty    Naye Johns MD OB/Gyn       Primary Care Provider Office Phone # Fax #    AZALIA Gonzalez [phone]      After Care Instructions     Activity       Review discharge instructions            Diet       Resume previous diet            Discharge Instructions - Postpartum visit       Schedule postpartum visit with your provider and return to clinic in 2 and 6 weeks.                  Your next 10 appointments already scheduled     May 07, 2018 10:30 AM CDT   Consult HOD with  LACTATION NURSE   [facility] ([facility])    [address]  [place] [address]  "  [phone]            May 16, 2018  3:45 PM CDT   SHORT with Naye Johns MD   [facility] ([facility])    [address]  [place] [address]   [phone]            Jun 13, 2018 11:15 AM CDT   Post Partum with Naye Johns MD   [facility] ([facility])    [address]  [place] [address]   [phone]              Pending Results     No orders found from 4/30/2018 to 5/3/2018.            Statement of Approval     Ordered          05/04/18 0759  I have reviewed and agree with all the recommendations and orders detailed in this document.  EFFECTIVE NOW     Approved and electronically signed by:  Naye Johns MD             Admission Information     Date & Time Provider Department Dept. Phone    5/2/2018 Naye Johns MD [facility] [phone]      Your Vitals Were     Blood Pressure Pulse Temperature Respirations Height Weight    115/70 (BP Location: Left arm) 90 98  F (36.7  C) (Oral) 14 1.727 m (5' 8\") 88.5 kg (195 lb)    Pulse Oximetry BMI (Body Mass Index)                98% 29.65 kg/m2          [employer] Information     [employer] gives you secure access to your electronic health record. If you see a primary care provider, you can also send messages to your care team and make appointments. If you have questions, please call your primary care clinic.  If you do not have a primary care provider, please call [phone] and they will assist you.        Care EveryWhere ID     This is your Care EveryWhere ID. This could be used by other organizations to access your [facility] medical records  [id number]        Equal Access to Services     KELLIE ECHAVARRIA : Marv Bains, tina matias, benigno conner. So [facility] [phone].    ATENCIÓN: Si habla español, tiene a lopez disposición servicios " vinh de asistencia lingüística. Ganesh jones [phone].    We comply with applicable federal civil rights laws and [place] laws. We do not discriminate on the basis of race, color, national origin, age, disability, sex, sexual orientation, or gender identity.               Review of your medicines      START taking        Dose / Directions    ibuprofen 600 MG tablet   Commonly known as:  ADVIL/MOTRIN        Dose:  600 mg   Take 1 tablet (600 mg) by mouth every 6 hours as needed for moderate pain   Quantity:  40 tablet   Refills:  0       oxyCODONE-acetaminophen 5-325 MG per tablet   Commonly known as:  PERCOCET        Dose:  1-2 tablet   Take 1-2 tablets by mouth every 4 hours as needed for other (pain control or improvement in physical function. Hold dose for analgesic side effects.)   Quantity:  30 tablet   Refills:  0       polyethylene glycol powder   Commonly known as:  MIRALAX        Dose:  1 capful   Take 17 g (1 capful) by mouth daily   Quantity:  510 g   Refills:  1         CONTINUE these medicines which have NOT CHANGED        Dose / Directions    breast pump Misc   Used for:  Encounter for supervision of normal first pregnancy in third trimester        Reason for need: breastfeeding. Length of need: 1 year   Quantity:  1 each   Refills:  0       * citalopram 20 MG tablet   Commonly known as:  celeXA        Dose:  20 mg   Take 20 mg by mouth daily   Refills:  0       * citalopram 40 MG tablet   Commonly known as:  celeXA   Used for:  Mild single current episode of major depressive disorder (H), BRANDON (generalized anxiety disorder)        Dose:  20 mg   Take 0.5 tablets (20 mg) by mouth daily   Quantity:  30 tablet   Refills:  1       doxylamine 25 MG Tabs tablet   Commonly known as:  UNISOM        Dose:  25 mg   Take 25 mg by mouth nightly as needed   Refills:  0       loratadine 10 MG tablet   Commonly known as:  CLARITIN        Dose:  10 mg   Take 10 mg by mouth daily   Refills:  0        metoclopramide 10 MG tablet   Commonly known as:  REGLAN   Used for:  Intractable chronic migraine without aura and without status migrainosus        Dose:  10 mg   Take 1 tablet (10 mg) by mouth once as needed   Quantity:  20 tablet   Refills:  1       Prenatal Vitamins 28-0.8 MG Tabs        Refills:  0       rizatriptan 10 MG tablet   Commonly known as:  MAXALT        Dose:  10 mg   Take 10 mg by mouth 2 times daily as needed   Refills:  0       * Notice:  This list has 2 medication(s) that are the same as other medications prescribed for you. Read the directions carefully, and ask your doctor or other care provider to review them with you.         Where to get your medicines      These medications were sent to [facility] [address] [place], [place] - [address] [facility] [address] [facility] [address]     Phone:  [phone]     ibuprofen 600 MG tablet    polyethylene glycol powder         Some of these will need a paper prescription and others can be bought over the counter. Ask your nurse if you have questions.     Bring a paper prescription for each of these medications     oxyCODONE-acetaminophen 5-325 MG per tablet                Protect others around you: Learn how to safely use, store and throw away your medicines at www.disposemymeds.org.        Information about OPIOIDS     PRESCRIPTION OPIOIDS: WHAT YOU NEED TO KNOW   You have a prescription for an opioid (narcotic) pain medicine. Opioids can cause addiction. If you have a history of chemical dependency of any type, you are at a higher risk of becoming addicted to opioids. Only take this medicine after all other options have been tried. Take it for as short a time and as few doses as possible.     Do not:    Drive. If you drive while taking these medicines, you could be arrested for driving under the influence (DUI).    Operate heavy machinery    Do any other dangerous activities while taking these medicines.      Drink any alcohol while taking these medicines.      Take with any other medicines that contain acetaminophen. Read all labels carefully. Look for the word  acetaminophen  or  Tylenol.  Ask your pharmacist if you have questions or are unsure.    Store your pills in a secure place, locked if possible. We will not replace any lost or stolen medicine. If you don t finish your medicine, please throw away (dispose) as directed by your pharmacist. The [place] Pollution Control Agency has more information about safe disposal: https://www.pca.[facility].[place].us/living-green/managing-unwanted-medications    All opioids tend to cause constipation. Drink plenty of water and eat foods that have a lot of fiber, such as fruits, vegetables, prune juice, apple juice and high-fiber cereal. Take a laxative (Miralax, milk of magnesia, Colace, Senna) if you don t move your bowels at least every other day.              Medication List: This is a list of all your medications and when to take them. Check marks below indicate your daily home schedule. Keep this list as a reference.      Medications           Morning Afternoon Evening Bedtime As Needed    breast pump Misc   Reason for need: breastfeeding. Length of need: 1 year                                * citalopram 20 MG tablet   Commonly known as:  celeXA   Take 20 mg by mouth daily                                * citalopram 40 MG tablet   Commonly known as:  celeXA   Take 0.5 tablets (20 mg) by mouth daily                                doxylamine 25 MG Tabs tablet   Commonly known as:  UNISOM   Take 25 mg by mouth nightly as needed                                ibuprofen 600 MG tablet   Commonly known as:  ADVIL/MOTRIN   Take 1 tablet (600 mg) by mouth every 6 hours as needed for moderate pain   Last time this was given:  600 mg on 5/4/2018  9:48 AM                                loratadine 10 MG tablet   Commonly known as:  CLARITIN   Take 10 mg by mouth daily                                 metoclopramide 10 MG tablet   Commonly known as:  REGLAN   Take 1 tablet (10 mg) by mouth once as needed                                oxyCODONE-acetaminophen 5-325 MG per tablet   Commonly known as:  PERCOCET   Take 1-2 tablets by mouth every 4 hours as needed for other (pain control or improvement in physical function. Hold dose for analgesic side effects.)   Last time this was given:  1 tablet on 5/4/2018 12:39 PM                                polyethylene glycol powder   Commonly known as:  MIRALAX   Take 17 g (1 capful) by mouth daily                                Prenatal Vitamins 28-0.8 MG Tabs                                rizatriptan 10 MG tablet   Commonly known as:  MAXALT   Take 10 mg by mouth 2 times daily as needed                                * Notice:  This list has 2 medication(s) that are the same as other medications prescribed for you. Read the directions carefully, and ask your doctor or other care provider to review them with you.       done

## 2023-04-19 NOTE — ASU PREOP CHECKLIST - SITE MARKED BY ANESTHESIOLOGIST
Bel Loredo presents to [facility] on 2017 for expansion of R breast tissue expander. Seen today by myself and Dr. Aric Iglesias      Review of patient's allergies indicates:  No Known Allergies  Current Outpatient Prescriptions on File Prior to Visit   Medication Sig Dispense Refill    anastrozole (ARIMIDEX) 1 mg Tab Take 1 tablet (1 mg total) by mouth once daily. 90 tablet 2    aspirin (ECOTRIN) 325 MG EC tablet Take 325 mg by mouth once daily.      cyclobenzaprine (FLEXERIL) 10 MG tablet Take one half to one tablet twice a day as needed 20 tablet 0    lisinopril-hydrochlorothiazide (PRINZIDE,ZESTORETIC) 20-25 mg Tab       metoprolol succinate (TOPROL-XL) 50 MG 24 hr tablet Take by mouth. 1 Tablet Sustained Release 24 hr Oral Every day      nitroGLYCERIN (NITROSTAT) 0.4 MG SL tablet Place 1 tablet (0.4 mg total) under the tongue every 5 (five) minutes as needed for Chest pain. 1 Tablet, Sublingual Sublingual .  as directed for chest pain. 30 tablet 0    rosuvastatin (CRESTOR) 20 MG tablet       vitamin D 1000 units Tab Take 185 mg by mouth once daily.      [DISCONTINUED] oxycodone-acetaminophen (PERCOCET) 5-325 mg per tablet Take 1 tablet by mouth every 4 (four) hours as needed for Pain. 31 tablet 0     No current facility-administered medications on file prior to visit.      Patient Active Problem List   Diagnosis    IBS (irritable bowel syndrome)    Abdominal pain, generalized    Constipation    Diarrhea    Otalgia, unspecified    Pain associated with accessory navicular bone of right foot    HTN (hypertension)    GERD (gastroesophageal reflux disease)    Left shoulder pain    Malignant neoplasm of upper-inner quadrant of right female breast    Breast cancer, right    Breast skin changes    Use of aromatase inhibitors     Past Surgical History:   Procedure Laterality Date    BREAST SURGERY       SECTION      ROTATOR CUFF REPAIR      TONSILLECTOMY,  ADENOIDECTOMY      TUBAL LIGATION       PHYSICAL EXAMINATION  Vitals:    07/26/17 1647   BP: 106/65   Pulse: 72   Temp: 98.7 °F (37.1 °C)     WD WN NAD  VSS  Lungs - normal effort  R Breast - incision well healed, tissue expander intact  L breast - incision well healed, nipple viable (+) sensation  Skin - warm/dry, no rash    ASSESSMENT/PLAN  63 y.o. F s/p R breast recon and L mastopexy  - doing well, no issues  - 120ccNS to R breast TE, tolerated well  - Refill on Flexeril and Percocet  - RTC x 2 weeks    All questions were answered. The patient was advised to call the clinic with any questions or concerns prior to their next visit.      n/a

## 2023-04-19 NOTE — PRE-ANESTHESIA EVALUATION ADULT - NSANTHOSAYNRD_GEN_A_CORE
denies/No. LEIGH ANN screening performed.  STOP BANG Legend: 0-2 = LOW Risk; 3-4 = INTERMEDIATE Risk; 5-8 = HIGH Risk No

## 2023-04-19 NOTE — CHART NOTE - NSCHARTNOTEFT_GEN_A_CORE
PACU ANESTHESIA ADMISSION NOTE        __x__  Patent Airway    __x__  Full return of protective reflexes    __x__  Full recovery from anesthesia / back to baseline status    Vitals:  T(C): 36.5 (04-19-23 @ 12:12), Max: 36.5 (04-19-23 @ 12:12)  HR: 81 (04-19-23 @ 12:12) (81 - 81)  BP: 132/76 (04-19-23 @ 12:12) (132/76 - 132/76)  RR: 18 (04-19-23 @ 12:12) (18 - 18)  SpO2: --    Mental Status:  __x__ Awake   ___x__ Alert   _____ Drowsy   _____ Sedated    Nausea/Vomiting:  __x__ NO  ______Yes,   See Post - Op Orders          Pain Scale (0-10):  _____    Treatment: ____ None    __x__ See Post - Op/PCA Orders    Post - Operative Fluids:   ____ Oral   __x__ See Post - Op Orders    Plan: Discharge:   ___x_Home       _____Floor     _____Critical Care    _____  Other:_________________    Comments: Patient had smooth intraoperative event, no anesthesia complication.

## 2023-04-19 NOTE — ASU DISCHARGE PLAN (ADULT/PEDIATRIC) - CARE PROVIDER_API CALL
Esperanza Mcclure)  Gastroenterology; Internal Medicine  [address] [place] [address]  Phone: [phone]  Fax: [phone]  Follow Up Time:

## 2023-04-19 NOTE — ASU PATIENT PROFILE, ADULT - TEACHING/LEARNING CULTURAL CONSIDERATIONS
Left message on patients voice mail to call back or send a portal email if this is more convenient   none

## 2023-04-25 DIAGNOSIS — Z90.722 ACQUIRED ABSENCE OF OVARIES, BILATERAL: ICD-10-CM

## 2023-04-25 DIAGNOSIS — K55.20 ANGIODYSPLASIA OF COLON WITHOUT HEMORRHAGE: ICD-10-CM

## 2023-04-25 DIAGNOSIS — K44.9 DIAPHRAGMATIC HERNIA WITHOUT OBSTRUCTION OR GANGRENE: ICD-10-CM

## 2023-04-25 DIAGNOSIS — K52.9 NONINFECTIVE GASTROENTERITIS AND COLITIS, UNSPECIFIED: ICD-10-CM

## 2023-04-25 DIAGNOSIS — K57.30 DIVERTICULOSIS OF LARGE INTESTINE WITHOUT PERFORATION OR ABSCESS WITHOUT BLEEDING: ICD-10-CM

## 2023-04-25 DIAGNOSIS — M19.90 UNSPECIFIED OSTEOARTHRITIS, UNSPECIFIED SITE: ICD-10-CM

## 2023-04-25 DIAGNOSIS — K29.80 DUODENITIS WITHOUT BLEEDING: ICD-10-CM

## 2023-04-25 DIAGNOSIS — K29.50 UNSPECIFIED CHRONIC GASTRITIS WITHOUT BLEEDING: ICD-10-CM

## 2023-04-25 DIAGNOSIS — K64.8 OTHER HEMORRHOIDS: ICD-10-CM

## 2023-04-25 DIAGNOSIS — K25.9 GASTRIC ULCER, UNSPECIFIED AS ACUTE OR CHRONIC, WITHOUT HEMORRHAGE OR PERFORATION: ICD-10-CM

## 2023-04-25 PROBLEM — E11.9 TYPE 2 DIABETES MELLITUS WITHOUT COMPLICATIONS: Chronic | Status: ACTIVE | Noted: 2023-04-19

## 2023-04-25 LAB — SURGICAL PATHOLOGY STUDY: SIGNIFICANT CHANGE UP

## 2023-05-15 ENCOUNTER — APPOINTMENT (OUTPATIENT)
Dept: GASTROENTEROLOGY | Facility: CLINIC | Age: 68
End: 2023-05-15
Payer: MEDICARE

## 2023-05-15 NOTE — HISTORY OF PRESENT ILLNESS
[Home] : at home, [unfilled] , at the time of the visit. [Medical Office: ([facility])___] : at the medical office located in  [de-identified] : 04/19/23 [FreeTextEntry1] : 04/19/23

## 2023-05-22 NOTE — ED ADULT NURSE NOTE - SUICIDE SCREENING QUESTION 2
Motrin as needed, start Cefdinir, f/up with PCP in 2-3 wks, sooner if fever > 2-3 days or symptoms worsen.    No

## 2023-06-12 ENCOUNTER — APPOINTMENT (OUTPATIENT)
Dept: GASTROENTEROLOGY | Facility: CLINIC | Age: 68
End: 2023-06-12

## 2023-06-12 VITALS — HEIGHT: 64 IN | BODY MASS INDEX: 27.31 KG/M2 | WEIGHT: 160 LBS

## 2023-06-12 DIAGNOSIS — R10.9 UNSPECIFIED ABDOMINAL PAIN: ICD-10-CM

## 2023-06-12 DIAGNOSIS — K59.00 CONSTIPATION, UNSPECIFIED: ICD-10-CM

## 2023-06-12 PROCEDURE — 99214 OFFICE O/P EST MOD 30 MIN: CPT

## 2023-06-12 RX ORDER — PERINDOPRIL ERBUMINE 2 MG/1
2 TABLET ORAL
Qty: 30 | Refills: 0 | Status: ACTIVE | COMMUNITY
Start: 2023-06-07

## 2023-06-12 RX ORDER — ATORVASTATIN CALCIUM 40 MG/1
40 TABLET, FILM COATED ORAL
Qty: 30 | Refills: 0 | Status: ACTIVE | COMMUNITY
Start: 2023-06-07

## 2023-06-12 RX ORDER — POLYETHYLENE GLYCOL 3350 17 G/17G
17 POWDER, FOR SOLUTION ORAL DAILY
Qty: 510 | Refills: 5 | Status: ACTIVE | COMMUNITY
Start: 2023-06-12 | End: 1900-01-01

## 2023-06-12 RX ORDER — ESOMEPRAZOLE MAGNESIUM 20 MG/1
20 CAPSULE, DELAYED RELEASE ORAL
Refills: 0 | Status: DISCONTINUED | COMMUNITY
End: 2023-06-12

## 2023-06-12 RX ORDER — POLYETHYLENE GLYCOL 3350 AND ELECTROLYTES WITH LEMON FLAVOR 236; 22.74; 6.74; 5.86; 2.97 G/4L; G/4L; G/4L; G/4L; G/4L
236 POWDER, FOR SOLUTION ORAL
Qty: 1 | Refills: 1 | Status: ACTIVE | COMMUNITY
Start: 2023-06-12 | End: 1900-01-01

## 2023-06-12 NOTE — PHYSICAL EXAM
[Alert] : alert [Normal Voice/Communication] : normal voice/communication [No Acute Distress] : no acute distress [Well Developed] : well developed [Well Nourished] : well nourished [Sclera] : the sclera and conjunctiva were normal [Normal] : heart rate was normal and rhythm regular, normal S1 and S2, no murmurs [None] : no edema [Bowel Sounds] : normal bowel sounds [No Masses] : no abdominal mass palpated [Abdomen Soft] : soft [] : no hepatosplenomegaly [Abdomen Tenderness] : non-tender [Ascites: ___] : no ascites [Rebound Tenderness] : no rebound tenderness

## 2023-06-12 NOTE — ASSESSMENT
[FreeTextEntry1] : 67 yr old female with H/O Abdominal pain, diarrhea, rectal bleeding who presented to the ED on 23 and CT Scan of Abdomen showed mural thickening of the transverse colon without fat stranding or inflammatory changes. She was prescribed antibiotics which she completed. She still C/O LLQ and RLQ pain (milder than previously) but no further diarrhea or rectal bleeding. She also C/O a long H/O heartburn; she had an EGD 7-8 yrs ago which showed an ulcer; she denied a H/O H Pylori.  She stated that she has the heartburn q day mostly when she eats spicy foods or cheese; she takes Esomeprazole OD q am x about 10 years without improvement. She denied dysphagia, fevers, chills, jaundice. She also reports melena, intermittently  x 1 year. She states that she has a H/O constipation with inability to completely empty her stools.  Her last colonoscopy was about 7 years ago. She stated that ulcers and polyps were found (but unsure of pathology results ([facility])). Her sister  of liver cancer but she denied a H/O gastric, esophageal, colon, or pancreatic cancer or IBD/Celiac disease.\par \par She is here today for F/U post EGD and colonoscopy. She stated that the LLQ pain had resolved but it recurred 3 days ago. She stated the pain  is intermittent, sometimes worse after eating and she does not eat any dairy products. She denied constipation, diarrhea, melena, or blood in the stools. She is still experiencing heartburn despite taking Nexium 20 mg q am. She has been taking meds for nasal congestion (ie Menthol throat drops) which seems to cause heartburn. EGD showed a small  hiatal hernia, normal esophagus, with a superficial  erosion in the antrum, duodenum was normal. Pathology was negative for Celiac Disease, H Pylori, intestinal metaplasia, and  dysplasia. Colonoscopy had a fair prep; a non bleeding AVM was noted, moderate diverticulosis, and external hemorrhoids. Repeat colonoscopy recommended within 1 year due to fair prep. \par \par \par \par Abdominal pain, no colitis noted on colonoscopy, probably due to chronic constipation\par \par - Results of colonoscopy discussed with patient today; need to repeat the colonoscopy in 6-12 months due to poor prep in the left and right colon.\par - Miralax q day to ensure regular BMs q day\par - Will send Golytely/Dulcolax prior to colonoscopy\par - F/U after procedure is done\par \par H/O GERD/Gastric ulcer with persistent heartburn\par \par - EGD results discussed with patient today\par - Increase PPI to bid \par - GERD diet and GERD measures strongly advised\par - She was told to call the office if symptoms persist \par \par \par

## 2023-06-12 NOTE — REVIEW OF SYSTEMS
[As Noted in HPI] : as noted in HPI [Abdominal Pain] : abdominal pain [Constipation] : constipation [Heartburn] : heartburn [Negative] : Heme/Lymph [Loss Of Hearing] : no hearing loss [Sore Throat] : no sore throat [Hoarseness] : no hoarseness [Vomiting] : no vomiting [Diarrhea] : no diarrhea [Melena (black stool)] : no melena [Bleeding] : no bleeding [Fecal Incontinence (soiling)] : no fecal incontinence [Bloating (gassiness)] : no bloating [FreeTextEntry4] : Nasal congestion

## 2023-06-12 NOTE — REASON FOR VISIT
[Consultation] : a consultation visit [Other: ______] : provided by [facility] [Time Spent: ____ minutes] : Total time spent using  services: [unfilled] minutes. The patient's primary language is not English thus required  services. [FreeTextEntry1] : RPA, post EGD/Colon [Interpreters_IDNumber] : [id number] [Interpreters_FullName] : Johnathan [TWNoteComboBox1] : Haitian

## 2023-08-22 NOTE — ED PROVIDER NOTE - ATTENDING APP SHARED VISIT CONTRIBUTION OF CARE
FAMILY HISTORY:  No pertinent family history in first degree relatives     68 yo F pmh as stated in the chart pw abd pain and bloody stool. Lower abd pain for the past 2 days associated with subjective f/c and blood in the stool. No n/v/d, no cp, no sob, no urinary sx, no vaginal bleeding/discharge, no back pain, no flank pain.     CONSTITUTIONAL: Well-developed; well-nourished; in no acute distress. Sitting up and providing appropriate history and physical examination  SKIN: skin exam is warm and dry, no acute rash.  HEAD: Normocephalic; atraumatic.  EYES: PERRL, 3 mm bilateral, no nystagmus, EOM intact; conjunctiva and sclera clear.  ENT: No nasal discharge; airway clear.  NECK: Supple; non tender. + full passive ROM in all directions. No JVD  CARD: S1, S2 normal; no murmurs, gallops, or rubs. Regular rate and rhythm. + Symmetric Strong Pulses  RESP: No wheezes, rales or rhonchi. Good air movement bilaterally  ABD: +ttp over lower abd. soft; non-distended. No Rebound, No Guarding, No signs of peritonitis, No CVA tenderness. No pulsatile abdominal mass. + Strong and Symmetric Pulses  EXT: Normal ROM. No clubbing, cyanosis or edema. Dp and Pt Pulses intact. Cap refill less than 3 seconds  NEURO: CN 2-12 intact, normal finger to nose, normal romberg, stable gait, no sensory or motor deficits, Alert, oriented, grossly unremarkable. No Focal deficits. GCS 15. NIH 0  PSYCH: Cooperative, appropriate.

## 2024-01-02 ENCOUNTER — RX RENEWAL (OUTPATIENT)
Age: 69
End: 2024-01-02

## 2024-03-25 ENCOUNTER — TRANSCRIPTION ENCOUNTER (OUTPATIENT)
Age: 69
End: 2024-03-25

## 2024-03-25 ENCOUNTER — RESULT REVIEW (OUTPATIENT)
Age: 69
End: 2024-03-25

## 2024-03-25 ENCOUNTER — OUTPATIENT (OUTPATIENT)
Dept: OUTPATIENT SERVICES | Facility: HOSPITAL | Age: 69
LOS: 1 days | Discharge: HOME | End: 2024-03-25
Payer: MEDICARE

## 2024-03-25 VITALS
TEMPERATURE: 98 F | RESPIRATION RATE: 18 BRPM | WEIGHT: 143.08 LBS | HEART RATE: 80 BPM | HEIGHT: 62 IN | DIASTOLIC BLOOD PRESSURE: 79 MMHG | SYSTOLIC BLOOD PRESSURE: 147 MMHG

## 2024-03-25 VITALS
RESPIRATION RATE: 17 BRPM | SYSTOLIC BLOOD PRESSURE: 146 MMHG | HEART RATE: 63 BPM | OXYGEN SATURATION: 100 % | DIASTOLIC BLOOD PRESSURE: 73 MMHG

## 2024-03-25 DIAGNOSIS — Z90.722 ACQUIRED ABSENCE OF OVARIES, BILATERAL: Chronic | ICD-10-CM

## 2024-03-25 DIAGNOSIS — Z98.890 OTHER SPECIFIED POSTPROCEDURAL STATES: Chronic | ICD-10-CM

## 2024-03-25 DIAGNOSIS — K52.9 NONINFECTIVE GASTROENTERITIS AND COLITIS, UNSPECIFIED: ICD-10-CM

## 2024-03-25 DIAGNOSIS — R10.9 UNSPECIFIED ABDOMINAL PAIN: ICD-10-CM

## 2024-03-25 PROCEDURE — 45385 COLONOSCOPY W/LESION REMOVAL: CPT

## 2024-03-25 PROCEDURE — 88305 TISSUE EXAM BY PATHOLOGIST: CPT

## 2024-03-25 PROCEDURE — 45380 COLONOSCOPY AND BIOPSY: CPT | Mod: XU

## 2024-03-25 PROCEDURE — 88305 TISSUE EXAM BY PATHOLOGIST: CPT | Mod: 26

## 2024-03-25 RX ORDER — AZELASTINE HCL 0.05 %
1 DROPS OPHTHALMIC (EYE)
Refills: 0 | DISCHARGE

## 2024-03-25 RX ORDER — SEMAGLUTIDE 0.68 MG/ML
2 INJECTION, SOLUTION SUBCUTANEOUS
Refills: 0 | DISCHARGE

## 2024-03-25 RX ORDER — AZELASTINE 137 UG/1
2 SPRAY, METERED NASAL
Refills: 0 | DISCHARGE

## 2024-03-25 RX ORDER — OMEPRAZOLE AND SODIUM BICARBONATE 40; 1100 MG/1; MG/1
1 CAPSULE, GELATIN COATED ORAL
Refills: 0 | DISCHARGE

## 2024-03-25 RX ORDER — ASPIRIN/CALCIUM CARB/MAGNESIUM 324 MG
1 TABLET ORAL
Refills: 0 | DISCHARGE

## 2024-03-25 RX ORDER — ATORVASTATIN CALCIUM 80 MG/1
1 TABLET, FILM COATED ORAL
Refills: 0 | DISCHARGE

## 2024-03-25 NOTE — H&P ADULT - ASSESSMENT
It was not have any changes in his physical exam.  He was still has quite substantial myelopathy particularly affecting his upper extremities and hands.  Blood pressure 138/68, pulse (!) 51, temperature 97.5 °F (36.4 °C), temperature source Temporal, resp. rate 18, height 5' 6\" (1.676 m), weight 83.7 kg (184 lb 8.4 oz), SpO2 98 %.  We will plan a C3-T1 decompression and fusion.  This is quite a major operation and the patient has substantial medical risk and comorbidities.  This was discussed with the patient but given his degree of neurologic deterioration I do think it was warranted.  He does wish to proceed.  
Plan Endoscopic evaluation with intervention as needed under anesthesia

## 2024-03-26 LAB — SURGICAL PATHOLOGY STUDY: SIGNIFICANT CHANGE UP

## 2024-03-29 DIAGNOSIS — Z79.82 LONG TERM (CURRENT) USE OF ASPIRIN: ICD-10-CM

## 2024-03-29 DIAGNOSIS — K64.8 OTHER HEMORRHOIDS: ICD-10-CM

## 2024-03-29 DIAGNOSIS — K64.4 RESIDUAL HEMORRHOIDAL SKIN TAGS: ICD-10-CM

## 2024-03-29 DIAGNOSIS — Z79.85 LONG-TERM (CURRENT) USE OF INJECTABLE NON-INSULIN ANTIDIABETIC DRUGS: ICD-10-CM

## 2024-03-29 DIAGNOSIS — E11.9 TYPE 2 DIABETES MELLITUS WITHOUT COMPLICATIONS: ICD-10-CM

## 2024-03-29 DIAGNOSIS — R93.3 ABNORMAL FINDINGS ON DIAGNOSTIC IMAGING OF OTHER PARTS OF DIGESTIVE TRACT: ICD-10-CM

## 2024-03-29 DIAGNOSIS — K57.30 DIVERTICULOSIS OF LARGE INTESTINE WITHOUT PERFORATION OR ABSCESS WITHOUT BLEEDING: ICD-10-CM

## 2024-03-29 DIAGNOSIS — M19.90 UNSPECIFIED OSTEOARTHRITIS, UNSPECIFIED SITE: ICD-10-CM

## 2024-04-03 ENCOUNTER — APPOINTMENT (OUTPATIENT)
Dept: GASTROENTEROLOGY | Facility: CLINIC | Age: 69
End: 2024-04-03
Payer: MEDICARE

## 2024-04-03 VITALS
HEIGHT: 64 IN | SYSTOLIC BLOOD PRESSURE: 110 MMHG | HEART RATE: 88 BPM | WEIGHT: 145 LBS | DIASTOLIC BLOOD PRESSURE: 70 MMHG | BODY MASS INDEX: 24.75 KG/M2 | OXYGEN SATURATION: 99 %

## 2024-04-03 DIAGNOSIS — K63.5 POLYP OF COLON: ICD-10-CM

## 2024-04-03 DIAGNOSIS — D64.9 ANEMIA, UNSPECIFIED: ICD-10-CM

## 2024-04-03 DIAGNOSIS — R12 HEARTBURN: ICD-10-CM

## 2024-04-03 PROCEDURE — 99213 OFFICE O/P EST LOW 20 MIN: CPT

## 2024-04-03 NOTE — REVIEW OF SYSTEMS
[As Noted in HPI] : as noted in HPI [Abdominal Pain] : abdominal pain [Constipation] : constipation [Heartburn] : heartburn [Negative] : Heme/Lymph [Loss Of Hearing] : no hearing loss [Sore Throat] : no sore throat [Hoarseness] : no hoarseness [Vomiting] : no vomiting [Diarrhea] : no diarrhea [Melena (black stool)] : no melena [Fecal Incontinence (soiling)] : no fecal incontinence [Swollen Glands] : no swollen glands [FreeTextEntry4] : Nasal congestion

## 2024-04-03 NOTE — ASSESSMENT
[FreeTextEntry1] : Patient is a 67-year-old female whom presents for follow-up from her additional colonoscopy.  Patient had prior EGD and colonoscopy of 2023 in which the EGD showed a small hiatal hernia and superficial erosion but reassuringly biopsies were negative for celiac, H. pylori, and intestinal metaplasia.  Patient had a colonoscopy at the time and had a fair prep with a nonbleeding AVM and moderate diverticulosis.  On repeat colonoscopy prep was significantly better and patient had removal of a small polyp which was an SSA on biopsy.  Patient currently feels well without any worrisome GI complaints.  Repeat Colon - 1 SSA removed - Prep much better than 2023 - With anemia Hx follow up 1 year  H/O GERD/Gastric ulcer with persistent heartburn - Can decrease PPI to daily - Follow up 1 year

## 2024-04-03 NOTE — PHYSICAL EXAM
[Alert] : alert [Normal Voice/Communication] : normal voice/communication [No Acute Distress] : no acute distress [Well Developed] : well developed [Well Nourished] : well nourished [Sclera] : the sclera and conjunctiva were normal [Normal] : heart rate was normal and rhythm regular, normal S1 and S2, no murmurs [None] : no edema [Abdomen Tenderness] : non-tender [Bowel Sounds] : normal bowel sounds [No Masses] : no abdominal mass palpated [Abdomen Soft] : soft [] : no hepatosplenomegaly [Ascites: ___] : no ascites [Rebound Tenderness] : no rebound tenderness

## 2024-04-22 ENCOUNTER — RX RENEWAL (OUTPATIENT)
Age: 69
End: 2024-04-22

## 2024-04-22 RX ORDER — OMEPRAZOLE 40 MG/1
40 CAPSULE, DELAYED RELEASE ORAL
Qty: 30 | Refills: 5 | Status: ACTIVE | COMMUNITY
Start: 2023-06-12 | End: 1900-01-01

## 2024-04-22 RX ORDER — OMEPRAZOLE, SODIUM BICARBONATE 40; 1680 MG/1; MG/1
40-1680 POWDER, FOR SUSPENSION ORAL
Qty: 60 | Refills: 4 | Status: DISCONTINUED | COMMUNITY
Start: 2024-01-02 | End: 2024-04-22

## 2025-05-08 ENCOUNTER — NON-APPOINTMENT (OUTPATIENT)
Age: 70
End: 2025-05-08

## 2025-06-16 ENCOUNTER — APPOINTMENT (OUTPATIENT)
Dept: GASTROENTEROLOGY | Facility: CLINIC | Age: 70
End: 2025-06-16